# Patient Record
Sex: FEMALE | Race: WHITE | Employment: PART TIME | URBAN - METROPOLITAN AREA
[De-identification: names, ages, dates, MRNs, and addresses within clinical notes are randomized per-mention and may not be internally consistent; named-entity substitution may affect disease eponyms.]

---

## 2017-06-08 ENCOUNTER — OFFICE VISIT (OUTPATIENT)
Dept: OBGYN CLINIC | Age: 27
End: 2017-06-08

## 2017-06-08 DIAGNOSIS — N64.4 BREAST PAIN, LEFT: Primary | ICD-10-CM

## 2017-06-08 NOTE — MR AVS SNAPSHOT
Visit Information Date & Time Provider Department Dept. Phone Encounter #  
 6/8/2017  8:20 AM Huseyin Wahl MD [facility] [phone] [id number] Upcoming Health Maintenance Date Due INFLUENZA AGE 9 TO ADULT 8/1/2017 PAP AKA CERVICAL CYTOLOGY 4/22/2018 Allergies as of 6/8/2017  Review Complete On: 6/8/2017 By: Freddy Huizar Severity Noted Reaction Type Reactions Ceftin [Cefuroxime Axetil]  01/21/2013    Rash Current Immunizations  Never Reviewed No immunizations on file. Not reviewed this visit Vitals OB Status Smoking Status Having regular periods Former Smoker Preferred Pharmacy Pharmacy Name Phone [id number] [place], [address] AT [facility] [phone] Your Updated Medication List  
  
   
This list is accurate as of: 6/8/17  8:51 AM.  Always use your most recent med list.  
  
  
  
  
 biotin 2,500 mcg Tab Take  by mouth. ibuprofen 200 mg Cap Take  by mouth.  
  
 loratadine 10 mg tablet Commonly known as:  Lendon Neto Take 1 Tab by mouth daily for 360 days. norgestimate-ethinyl estradiol 0.25-35 mg-mcg Tab Commonly known as:  Blanca Florinda Take 1 Tab by mouth daily. pseudoephedrine 30 mg tablet Commonly known as:  SUDAFED Take 2 Tabs by mouth every six (6) hours as needed for Congestion. WOMEN'S DAILY MULTIVITAMIN PO Take  by mouth. Introducing hospitals & HEALTH SERVICES! Dear Cas Jeffers: Thank you for requesting a "[employer] account. Our records indicate that you already have an active "[employer] account. You can access your account anytime at [email or web address] Did you know that you can access your hospital and ER discharge instructions at any time in "[employer]? You can also review all of your test results from your hospital stay or ER visit. Additional Information If you have questions, please visit the Frequently Asked Questions section of the Done In :[employer] website at [email or web address] Remember, "[employer]" is NOT to be used for urgent needs. For medical emergencies, dial 911. Now available from your iPhone and Android! Please provide this summary of care documentation to your next provider. Your primary care clinician is listed as Gildardo Pacheco. If you have any questions after today's visit, please call [phone].

## 2017-06-08 NOTE — PROGRESS NOTES
Breast Pain Evaluation    Linda Luu is a No obstetric history on file. ,  31 y.o. female [facility] No LMP recorded. .    She presents with breast pain located in the left breast at 2 o'clock. She noticed it 2 weeks ago. The pain has changed in intensity as it has improved but is still present. The patient has not noticed changes in the area of the pain: No dimpling, nipple retraction or discharge. No masses or nodes. .    The patient notes the following associated symptoms: none    She notes that the following factors improve her symptoms: none    She notes that the following factors aggravate her symptoms:none    She has had any diagnostic studies for this problem since she noticed it. With regards to breast problems her medical history is significant for the following: none      Past Medical History:   Diagnosis Date    Heavy menses 05/2012    Pap smear for cervical cancer screening 4/22/15 neg    Psychiatric disorder     depression - stopped seeing 10/01/12    Unspecified adverse effect of anesthesia     motion sickness     Past Surgical History:   Procedure Laterality Date    HX HEENT      wisdom teeth    HX WISDOM TEETH EXTRACTION  03/2012     Social History     Occupational History    Not on file. Social History Main Topics    Smoking status: Former Smoker     Packs/day: 0.50     Years: 6.00    Smokeless tobacco: Never Used    Alcohol use 2.0 oz/week     4 Glasses of wine per week      Comment: weekly    Drug use: No    Sexual activity: Yes     Partners: Male     Birth control/ protection: Pill     Family History   Problem Relation Age of Onset    Hypertension Mother     Elevated Lipids Mother     Hypertension Father     Elevated Lipids Father     Cancer Maternal Grandfather      lung to brain       Allergies   Allergen Reactions    Ceftin [Cefuroxime Axetil] Rash     Prior to Admission medications    Medication Sig Start Date End Date Taking?  Authorizing Provider MULTIVIT WITH CALCIUM,IRON,MIN (WOMEN'S DAILY MULTIVITAMIN PO) Take  by mouth. Yes Historical Provider   norgestimate-ethinyl estradiol (ORTHO-CYCLEN) 0.25-35 mg-mcg tab Take 1 Tab by mouth daily. 6/22/16  Yes ePg Sullivan MD   biotin 2,500 mcg tab Take  by mouth. Historical Provider   loratadine (CLARITIN) 10 mg tablet Take 1 Tab by mouth daily for 360 days. 8/12/16 8/7/17  Xiomara Mays MD   pseudoephedrine (SUDAFED) 30 mg tablet Take 2 Tabs by mouth every six (6) hours as needed for Congestion. 8/12/16   Xiomara Mays MD   ibuprofen 200 mg Cap Take  by mouth. Historical Provider        Review of Systems: History obtained from the patient  Constitutional: negative for weight loss, fever, night sweats  HEENT: negative for hearing loss, earache, congestion, snoring, sorethroat  CV: negative for chest pain, palpitations, edema  Resp: negative for cough, shortness of breath, wheezing  Breast: see above  GI: negative for change in bowel habits, abdominal pain, black or bloody stools  : negative for frequency, dysuria, hematuria, vaginal discharge  MSK: negative for back pain, joint pain, muscle pain  Skin: negative for itching, rash, hives  Neuro: negative for dizziness, headache, confusion, weakness  Psych: negative for anxiety, depression, change in mood  Heme/lymph: negative for bleeding, bruising, pallor    Objective: There were no vitals taken for this visit.   Exam:  Constitutional  · Appearance: well-nourished, well developed, alert, in no acute distress    HENT  · Head and Face: appears normal    Neck  · Inspection/Palpation: normal appearance, no masses or tenderness  · Lymph Nodes: no lymphadenopathy present  · Thyroid: gland size normal, nontender, no nodules or masses present on palpation    Breasts  · Inspection of Breasts: breasts symmetrical, no skin changes, no discharge present, nipple appearance normal, no skin retraction present  · Palpation of Breasts and Axillae: no masses present on palpation, + left breast tenderness  · Axillary Lymph Nodes: no lymphadenopathy present    Assessment/Plan:  Left breast tenderness, will schedule ultrasound and refer to breast surgeon.   Likely musculoskeletal.

## 2017-06-12 ENCOUNTER — HOSPITAL ENCOUNTER (OUTPATIENT)
Dept: MAMMOGRAPHY | Age: 27
Discharge: HOME | End: 2017-06-12
Attending: OBSTETRICS & GYNECOLOGY
Payer: COMMERCIAL

## 2017-06-12 DIAGNOSIS — N63.20 BREAST MASS, LEFT: ICD-10-CM

## 2017-06-12 PROCEDURE — 76642 ULTRASOUND BREAST LIMITED: CPT

## 2017-07-24 ENCOUNTER — OFFICE VISIT (OUTPATIENT)
Dept: OBGYN CLINIC | Age: 27
End: 2017-07-24

## 2017-07-24 VITALS
WEIGHT: 132 LBS | BODY MASS INDEX: 21.21 KG/M2 | HEIGHT: 66 IN | DIASTOLIC BLOOD PRESSURE: 64 MMHG | SYSTOLIC BLOOD PRESSURE: 102 MMHG

## 2017-07-24 DIAGNOSIS — Z20.2 POSSIBLE EXPOSURE TO STD: ICD-10-CM

## 2017-07-24 DIAGNOSIS — Z01.419 WELL FEMALE EXAM WITH ROUTINE GYNECOLOGICAL EXAM: Primary | ICD-10-CM

## 2017-07-24 NOTE — MR AVS SNAPSHOT
Visit Information Date & Time Provider Department Dept. Phone Encounter #  
 7/24/2017  2:20 PM Alexis Martinez MD [facility] [phone] [id number] Upcoming Health Maintenance Date Due INFLUENZA AGE 9 TO ADULT 8/1/2017 PAP AKA CERVICAL CYTOLOGY 4/22/2018 Allergies as of 7/24/2017  Review Complete On: 7/24/2017 By: April Gay Severity Noted Reaction Type Reactions Ceftin [Cefuroxime Axetil]  01/21/2013    Rash Current Immunizations  Never Reviewed No immunizations on file. Not reviewed this visit You Were Diagnosed With   
  
 Codes Comments Well female exam with routine gynecological exam    -  Primary ICD-10-CM: O78.690 ICD-9-CM: V72.31 Possible exposure to STD     ICD-10-CM: Z20.2 ICD-9-CM: V01.6 Vitals BP Height(growth percentile) Weight(growth percentile) BMI OB Status Smoking Status 102/64 5' 6\" (1.676 m) 132 lb (59.9 kg) 21.31 kg/m2 Having regular periods Former Smoker BMI and BSA Data Body Mass Index Body Surface Area  
 21.31 kg/m 2 1.67 m 2 Preferred Pharmacy Pharmacy Name Phone [id number] [address] [facility] [phone] Your Updated Medication List  
  
   
This list is accurate as of: 7/24/17  2:41 PM.  Always use your most recent med list.  
  
  
  
  
 biotin 2,500 mcg Tab Take  by mouth. ibuprofen 200 mg Cap Take  by mouth.  
  
 loratadine 10 mg tablet Commonly known as:  Susa Fuchs Take 1 Tab by mouth daily for 360 days. norgestimate-ethinyl estradiol 0.25-35 mg-mcg Tab Commonly known as:  Carleene Sever Take 1 Tab by mouth daily. pseudoephedrine 30 mg tablet Commonly known as:  SUDAFED Take 2 Tabs by mouth every six (6) hours as needed for Congestion. WOMEN'S DAILY MULTIVITAMIN PO Take  by mouth. We Performed the Following HEP B SURFACE AG [id number] CPT(R)] HEPATITIS C AB [34775 CPT(R)] HIV 1/2 AG/AB, 4TH GENERATION,W RFLX CONFIRM [[id number] Custom] PAP IG, CT-NG TV [address] HPV [id number], [id number]) Q7810379 CPT(R)] RPR [92798 CPT(R)] Introducing [facility] & HEALTH SERVICES! Dear Estevan Villar: Thank you for requesting a [employer] account. Our records indicate that you already have an active [employer] account. You can access your account anytime at [email or web address] Did you know that you can access your hospital and ER discharge instructions at any time in [employer]? You can also review all of your test results from your hospital stay or ER visit. Additional Information If you have questions, please visit the Frequently Asked Questions section of the [employer] website at [email or web address] Remember, [employer] is NOT to be used for urgent needs. For medical emergencies, dial 911. Now available from your iPhone and Android! Please provide this summary of care documentation to your next provider. Your primary care clinician is listed as NONE. If you have any questions after today's visit, please call [phone].

## 2017-07-24 NOTE — PROGRESS NOTES
Annual exam ages 21-44    Lady Maravilla is a No obstetric history on file. ,  31 y.o. female [facility] No LMP recorded. .    She presents for her annual checkup. She is having no significant problems. She wants std testing today. With regard to the Gardasil vaccine, she is older than the FDA approved age to receive it. Menstrual status:    Her periods are moderate in flow. She is using three to five pads or tampons per day, usually regular and last 26-30 days. She denies dysmenorrhea. She reports no premenstrual symptoms. Contraception:    The current method of family planning is OCP (estrogen/progesterone). Sexual history:     She  reports that she currently engages in sexual activity and has had male partners. She reports using the following method of birth control/protection: Pill. .    Medical conditions:    Since her last annual GYN exam about one year ago, she has not the following changes in her health history: none. Pap and Mammogram History:    Her most recent Pap smear was normal, obtained 3 year(s) ago. The patient has never had a mammogram.    Breast Cancer History/Substance Abuse: negative    Past Medical History:   Diagnosis Date    Heavy menses 05/2012    Pap smear for cervical cancer screening 4/22/15 neg    Psychiatric disorder     depression - stopped seeing 10/01/12    Unspecified adverse effect of anesthesia     motion sickness     Past Surgical History:   Procedure Laterality Date    HX HEENT      wisdom teeth    HX WISDOM TEETH EXTRACTION  03/2012       Current Outpatient Prescriptions   Medication Sig Dispense Refill    norgestimate-ethinyl estradiol (ORTHO-CYCLEN) 0.25-35 mg-mcg tab Take 1 Tab by mouth daily. 90 Tab 4    MULTIVIT WITH CALCIUM,IRON,MIN (WOMEN'S DAILY MULTIVITAMIN PO) Take  by mouth.  biotin 2,500 mcg tab Take  by mouth.  loratadine (CLARITIN) 10 mg tablet Take 1 Tab by mouth daily for 360 days.  30 Tab 11    pseudoephedrine (SUDAFED) 30 mg tablet Take 2 Tabs by mouth every six (6) hours as needed for Congestion. 120 Tab 1    ibuprofen 200 mg Cap Take  by mouth. Allergies: Ceftin [cefuroxime axetil]     Tobacco History:  reports that she has quit smoking. She has a 3.00 pack-year smoking history. She has never used smokeless tobacco.  Alcohol Abuse:  reports that she drinks about 2.0 oz of alcohol per week   Drug Abuse:  reports that she does not use illicit drugs.     Family Medical/Cancer History:   Family History   Problem Relation Age of Onset    Hypertension Mother     Elevated Lipids Mother     Hypertension Father     Elevated Lipids Father     Cancer Maternal Grandfather      lung to brain        Review of Systems - History obtained from the patient  Constitutional: negative for weight loss, fever, night sweats  HEENT: negative for hearing loss, earache, congestion, snoring, sorethroat  CV: negative for chest pain, palpitations, edema  Resp: negative for cough, shortness of breath, wheezing  GI: negative for change in bowel habits, abdominal pain, black or bloody stools  : negative for frequency, dysuria, hematuria, vaginal discharge  MSK: negative for back pain, joint pain, muscle pain  Breast: negative for breast lumps, nipple discharge, galactorrhea  Skin :negative for itching, rash, hives  Neuro: negative for dizziness, headache, confusion, weakness  Psych: negative for anxiety, depression, change in mood  Heme/lymph: negative for bleeding, bruising, pallor    Physical Exam    Visit Vitals    /64    Ht 5' 6\" (1.676 m)    Wt 132 lb (59.9 kg)    BMI 21.31 kg/m2       Constitutional  · Appearance: well-nourished, well developed, alert, in no acute distress    HENT  · Head and Face: appears normal    Neck  · Inspection/Palpation: normal appearance, no masses or tenderness  · Lymph Nodes: no lymphadenopathy present  · Thyroid: gland size normal, nontender, no nodules or masses present on palpation    Chest  · Respiratory Effort: breathing unlabored    Breasts  · Inspection of Breasts: breasts symmetrical, no skin changes, no discharge present, nipple appearance normal, no skin retraction present  · Palpation of Breasts and Axillae: no masses present on palpation, no breast tenderness  · Axillary Lymph Nodes: no lymphadenopathy present    Gastrointestinal  · Abdominal Examination: abdomen non-tender to palpation, normal bowel sounds, no masses present  · Liver and spleen: no hepatomegaly present, spleen not palpable  · Hernias: no hernias identified    Genitourinary  · External Genitalia: normal appearance for age, no discharge present, no tenderness present, no inflammatory lesions present, no masses present, no atrophy present  · Vagina: normal vaginal vault without central or paravaginal defects, no discharge present, no inflammatory lesions present, no masses present  · Bladder: non-tender to palpation  · Urethra: appears normal  · Cervix: normal   · Uterus: normal size, shape and consistency  · Adnexa: no adnexal tenderness present, no adnexal masses present  · Perineum: perineum within normal limits, no evidence of trauma, no rashes or skin lesions present  · Anus: anus within normal limits, no hemorrhoids present  · Inguinal Lymph Nodes: no lymphadenopathy present    Skin  · General Inspection: no rash, no lesions identified    Neurologic/Psychiatric  · Mental Status:  · Orientation: grossly oriented to person, place and time  · Mood and Affect: mood normal, affect appropriate    . Assessment:  Routine gynecologic examination  Her current medical status is satisfactory with no evidence of significant gynecologic issues.     Plan:  Counseled re: diet, exercise, healthy lifestyle  Return for yearly wellness visits

## 2017-07-25 LAB
HBV SURFACE AG SERPL QL IA: NEGATIVE
HCV AB S/CO SERPL IA: <0.1 S/CO RATIO (ref 0–0.9)
HIV 1+2 AB+HIV1 P24 AG SERPL QL IA: NON REACTIVE
RPR SER QL: NON REACTIVE

## 2017-08-03 LAB
C TRACH RRNA CVX QL NAA+PROBE: NEGATIVE
CYTOLOGIST CVX/VAG CYTO: NORMAL
CYTOLOGY CVX/VAG DOC THIN PREP: NORMAL
CYTOLOGY HISTORY:: NORMAL
DX ICD CODE: NORMAL
LABCORP, 190119: NORMAL
Lab: NORMAL
N GONORRHOEA RRNA CVX QL NAA+PROBE: NEGATIVE
OTHER STN SPEC: NORMAL
PATH REPORT.FINAL DX SPEC: NORMAL
STAT OF ADQ CVX/VAG CYTO-IMP: NORMAL
T VAGINALIS RRNA SPEC QL NAA+PROBE: NEGATIVE

## 2017-10-06 RX ORDER — NORGESTIMATE AND ETHINYL ESTRADIOL 0.25-0.035
1 KIT ORAL DAILY
Qty: 3 PACKAGE | Refills: 4 | Status: SHIPPED | OUTPATIENT
Start: 2017-10-06 | End: 2018-11-30 | Stop reason: SDUPTHER

## 2017-10-06 NOTE — TELEPHONE ENCOUNTER
From: Sil Toth  To: Alfredo Lowe MD  Sent: 10/5/2017 5:29 PM EDT  Subject: Medication Renewal Request    Original authorizing provider: MD Hailee Card [facility] would like a refill of the following medications:  norgestimate-ethinyl estradiol (ORTHO-CYCLEN) 0.25-35 mg-mcg tab Alfredo Lowe MD]    Preferred pharmacy: [address] [place] [address] 83:  I thought I got this re-filled at my annual in July, but [facility] [address] [place] [address] Rd. in [facility] said they did not receive a new one. Patient had AE on 7/24/17.    ?ok to refill   Please advise

## 2018-03-01 ENCOUNTER — OFFICE VISIT (OUTPATIENT)
Dept: OBGYN CLINIC | Age: 28
End: 2018-03-01

## 2018-03-01 VITALS
BODY MASS INDEX: 21.21 KG/M2 | HEIGHT: 66 IN | WEIGHT: 132 LBS | DIASTOLIC BLOOD PRESSURE: 64 MMHG | SYSTOLIC BLOOD PRESSURE: 102 MMHG

## 2018-03-01 DIAGNOSIS — Z20.2 POSSIBLE EXPOSURE TO STD: ICD-10-CM

## 2018-03-01 DIAGNOSIS — N76.0 VAGINITIS AND VULVOVAGINITIS: Primary | ICD-10-CM

## 2018-03-01 RX ORDER — FLUCONAZOLE 150 MG/1
150 TABLET ORAL DAILY
Qty: 1 TAB | Refills: 0 | Status: SHIPPED | OUTPATIENT
Start: 2018-03-01 | End: 2018-03-02

## 2018-03-01 RX ORDER — NYSTATIN AND TRIAMCINOLONE ACETONIDE 100000; 1 [USP'U]/G; MG/G
OINTMENT TOPICAL 2 TIMES DAILY
Qty: 1 TUBE | Refills: 3 | Status: SHIPPED | OUTPATIENT
Start: 2018-03-01 | End: 2018-03-08

## 2018-03-01 NOTE — PATIENT INSTRUCTIONS

## 2018-03-01 NOTE — PROGRESS NOTES
Vaginitis evaluation    Chief Complaint   Vaginitis      HPI  28 y.o. female complains of white and creamy vaginal discharge with irritation for 4 days. No LMP recorded. She denies additional symptoms at this time. The patient denies aggravating factors. She is  concerned about possible STI exposure at this time and would like to get a STD panel. She denies exposure to new chemicals or hygenic agents  Previous treatment included: none    Past Medical History:   Diagnosis Date    Heavy menses 05/2012    Pap smear for cervical cancer screening 4/22/15 neg    Psychiatric disorder     depression - stopped seeing 10/01/12    Unspecified adverse effect of anesthesia     motion sickness     Past Surgical History:   Procedure Laterality Date    HX HEENT      wisdom teeth    HX WISDOM TEETH EXTRACTION  03/2012     Social History     Occupational History    Not on file. Social History Main Topics    Smoking status: Former Smoker     Packs/day: 0.50     Years: 6.00    Smokeless tobacco: Never Used    Alcohol use 2.0 oz/week     4 Glasses of wine per week      Comment: weekly    Drug use: No    Sexual activity: Yes     Partners: Male     Birth control/ protection: Pill     Family History   Problem Relation Age of Onset    Hypertension Mother     Elevated Lipids Mother     Hypertension Father     Elevated Lipids Father     Cancer Maternal Grandfather      lung to brain        Allergies   Allergen Reactions    Ceftin [Cefuroxime Axetil] Rash     Prior to Admission medications    Medication Sig Start Date End Date Taking? Authorizing Provider   norgestimate-ethinyl estradiol ([place]) 0.25-35 mg-mcg tab Take 1 Tab by mouth daily. 10/6/17   Erika Sanchez MD   MULTIVIT WITH CALCIUM,IRON,MIN [facility] DAILY MULTIVITAMIN PO) Take  by mouth. Historical Provider   biotin 2,500 mcg tab Take  by mouth.     Historical Provider   pseudoephedrine (SUDAFED) 30 mg tablet Take 2 Tabs by mouth every six (6) hours as needed for Congestion. 8/12/16   Hannah Beard MD   ibuprofen 200 mg Cap Take  by mouth.       Historical Provider                      Review of Systems - History obtained from the patient  Constitutional: negative for weight loss, fever, night sweats  Breast: negative for breast lumps, nipple discharge, galactorrhea  GI: negative for change in bowel habits, abdominal pain, black or bloody stools  : negative for frequency, dysuria, hematuria  MSK: negative for back pain, joint pain, muscle pain  Skin: negative for itching, rash, hives  Neuro: negative for dizziness, headache, confusion, weakness  Psych: negative for anxiety, depression, change in mood  Heme/lymph: negative for bleeding, bruising, pallor       Objective:    Visit Vitals    /64    Ht 5' 6\" (1.676 m)    Wt 132 lb (59.9 kg)    BMI 21.31 kg/m2       Physical Exam:   PHYSICAL EXAMINATION    Constitutional  · Appearance: well-nourished, well developed, alert, in no acute distress    HENT  · Head and Face: appears normal    Genitourinary  · External Genitalia: normal appearance for age, + discharge present, no tenderness present, no inflammatory lesions present, no masses present, no atrophy present  · Vagina:  + discharge present, otherwise normal vaginal vault without central or paravaginal defects, no inflammatory lesions present, no masses present  · Bladder: non-tender to palpation  · Urethra: appears normal  · Cervix: normal   · Uterus: normal size, shape and consistency  · Adnexa: no adnexal tenderness present, no adnexal masses present  · Perineum: perineum within normal limits, no evidence of trauma, no rashes or skin lesions present  · Anus: anus within normal limits, no hemorrhoids present  · Inguinal Lymph Nodes: no lymphadenopathy present    Skin  · General Inspection: no rash, no lesions identified    Neurologic/Psychiatric  · Mental Status:  · Orientation: grossly oriented to person, place and time  · Mood and Affect: mood normal, affect appropriate    Assessment:   Vaginitis, likely yeast, will treat with diflucan and mycolog and f/u with nuswab    Plan:   ROV prn if symptoms persist or worsen.

## 2018-03-05 LAB
A VAGINAE DNA VAG QL NAA+PROBE: ABNORMAL SCORE
BVAB2 DNA VAG QL NAA+PROBE: ABNORMAL SCORE
C ALBICANS DNA VAG QL NAA+PROBE: POSITIVE
C GLABRATA DNA VAG QL NAA+PROBE: NEGATIVE
C TRACH RRNA SPEC QL NAA+PROBE: NEGATIVE
MEGA1 DNA VAG QL NAA+PROBE: ABNORMAL SCORE
N GONORRHOEA RRNA SPEC QL NAA+PROBE: NEGATIVE
T VAGINALIS RRNA SPEC QL NAA+PROBE: NEGATIVE

## 2018-04-18 ENCOUNTER — OFFICE VISIT (OUTPATIENT)
Dept: OBGYN CLINIC | Age: 28
End: 2018-04-18

## 2018-04-18 DIAGNOSIS — N76.0 ACUTE VAGINITIS: Primary | ICD-10-CM

## 2018-04-18 RX ORDER — METRONIDAZOLE 500 MG/1
500 TABLET ORAL 2 TIMES DAILY
Qty: 14 TAB | Refills: 0 | Status: SHIPPED | OUTPATIENT
Start: 2018-04-18 | End: 2018-04-25

## 2018-04-18 NOTE — PROGRESS NOTES
Chief Complaint   Vaginitis      HPI  28 y.o. female complains of clear vaginal discharge since yesterday. .No LMP recorded. She admits to additional symptoms at this time. Vaginal odor  The patient  admits to aggravating factors- finished antibiotics yesterday for UTI  She denies exposure to new chemicals ot hygenic agents  Previous treatment included:  3/1/18 yeast infection treated with diflucan and mycolog    Past Medical History:   Diagnosis Date    Heavy menses 05/2012    Pap smear for cervical cancer screening 4/22/15 neg    Psychiatric disorder     depression - stopped seeing 10/01/12    Unspecified adverse effect of anesthesia     motion sickness     Past Surgical History:   Procedure Laterality Date    HX HEENT      wisdom teeth    HX WISDOM TEETH EXTRACTION  03/2012     Social History     Occupational History    Not on file. Social History Main Topics    Smoking status: Former Smoker     Packs/day: 0.50     Years: 6.00    Smokeless tobacco: Never Used    Alcohol use 2.0 oz/week     4 Glasses of wine per week      Comment: weekly    Drug use: No    Sexual activity: Yes     Partners: Male     Birth control/ protection: Pill     Family History   Problem Relation Age of Onset    Hypertension Mother     Elevated Lipids Mother     Hypertension Father     Elevated Lipids Father     Cancer Maternal Grandfather      lung to brain        Allergies   Allergen Reactions    Ceftin [Cefuroxime Axetil] Rash     Prior to Admission medications    Medication Sig Start Date End Date Taking? Authorizing Provider   norgestimate-ethinyl estradiol (Elizabeth King) 0.25-35 mg-mcg tab Take 1 Tab by mouth daily. 10/6/17   Sergio Vieyra MD   MULTIVIT WITH CALCIUM,IRON,MIN [facility] DAILY MULTIVITAMIN PO) Take  by mouth. Historical Provider   biotin 2,500 mcg tab Take  by mouth.     Historical Provider   pseudoephedrine (SUDAFED) 30 mg tablet Take 2 Tabs by mouth every six (6) hours as needed for Congestion. 8/12/16   Skyler Echavarria MD   ibuprofen 200 mg Cap Take  by mouth. Historical Provider                      Review of Systems - History obtained from the patient  Constitutional: negative for weight loss, fever, night sweats  Breast: negative for breast lumps, nipple discharge, galactorrhea  GI: negative for change in bowel habits, abdominal pain, black or bloody stools  : negative for frequency, dysuria, hematuria  MSK: negative for back pain, joint pain, muscle pain  Skin: negative for itching, rash, hives  Neuro: negative for dizziness, headache, confusion, weakness  Psych: negative for anxiety, depression, change in mood  Heme/lymph: negative for bleeding, bruising, pallor       Objective: There were no vitals taken for this visit.     Physical Exam:   PHYSICAL EXAMINATION    Constitutional  · Appearance: well-nourished, well developed, alert, in no acute distress    HENT  · Head and Face: appears normal    Genitourinary  · External Genitalia: normal appearance for age, + discharge present, no tenderness present, no inflammatory lesions present, no masses present, no atrophy present  · Vagina:  + discharge present, otherwise normal vaginal vault without central or paravaginal defects, no inflammatory lesions present, no masses present  · Bladder: non-tender to palpation  · Urethra: appears normal  · Cervix: normal   · Uterus: normal size, shape and consistency  · Adnexa: no adnexal tenderness present, no adnexal masses present  · Perineum: perineum within normal limits, no evidence of trauma, no rashes or skin lesions present  · Anus: anus within normal limits, no hemorrhoids present  · Inguinal Lymph Nodes: no lymphadenopathy present    Skin  · General Inspection: no rash, no lesions identified    Neurologic/Psychiatric  · Mental Status:  · Orientation: grossly oriented to person, place and time  · Mood and Affect: mood normal, affect appropriate    Assessment:   Vaginitis- likely bv, will treat with flagyl and f/u with nuswab    Plan:     ROV prn if symptoms persist or worsen.

## 2018-04-21 LAB
A VAGINAE DNA VAG QL NAA+PROBE: NORMAL SCORE
BVAB2 DNA VAG QL NAA+PROBE: NORMAL SCORE
C ALBICANS DNA VAG QL NAA+PROBE: NEGATIVE
C GLABRATA DNA VAG QL NAA+PROBE: NEGATIVE
C TRACH RRNA SPEC QL NAA+PROBE: NEGATIVE
MEGA1 DNA VAG QL NAA+PROBE: NORMAL SCORE
N GONORRHOEA RRNA SPEC QL NAA+PROBE: NEGATIVE
T VAGINALIS RRNA SPEC QL NAA+PROBE: NEGATIVE

## 2018-11-19 ENCOUNTER — TELEPHONE (OUTPATIENT)
Dept: OBGYN CLINIC | Age: 28
End: 2018-11-19

## 2018-11-19 NOTE — TELEPHONE ENCOUNTER
Message left at 330PM    33year old patient last seen in the office on 7/24/17. Patient calling about a refill on her birth control. This nurse called the patient back and advised of need for AE needed and to schedule and ask to speak to nurse to get refill on OCP till she is seen.

## 2018-11-30 ENCOUNTER — OFFICE VISIT (OUTPATIENT)
Dept: OBGYN CLINIC | Age: 28
End: 2018-11-30

## 2018-11-30 VITALS
DIASTOLIC BLOOD PRESSURE: 70 MMHG | SYSTOLIC BLOOD PRESSURE: 104 MMHG | WEIGHT: 129 LBS | HEIGHT: 66 IN | BODY MASS INDEX: 20.73 KG/M2

## 2018-11-30 DIAGNOSIS — Z01.419 ENCOUNTER FOR GYNECOLOGICAL EXAMINATION WITHOUT ABNORMAL FINDING: Primary | ICD-10-CM

## 2018-11-30 RX ORDER — SERTRALINE HYDROCHLORIDE 25 MG/1
TABLET, FILM COATED ORAL DAILY
COMMUNITY

## 2018-11-30 RX ORDER — BUPROPION HYDROCHLORIDE 75 MG/1
TABLET ORAL 2 TIMES DAILY
COMMUNITY

## 2018-11-30 RX ORDER — NORGESTIMATE AND ETHINYL ESTRADIOL 0.25-0.035
1 KIT ORAL DAILY
Qty: 3 PACKAGE | Refills: 4 | Status: SHIPPED | OUTPATIENT
Start: 2018-11-30

## 2018-11-30 NOTE — PROGRESS NOTES
Annual exam ages 21-44    Dillan Colmenares is a No obstetric history on file. ,  33 y.o. female [facility] [place] No LMP recorded. .    She presents for her annual checkup. She is having no significant problems. With regard to the Gardasil vaccine, she is older than the FDA approved age to receive it. Menstrual status:    Her periods are moderate in flow. She is using three to five pads or tampons per day, usually regular and last 26-30 days. She denies dysmenorrhea. She reports no premenstrual symptoms. Contraception:    The current method of family planning is condoms as she has no refills on her OCP's. Sexual history:     She  reports that she currently engages in sexual activity and has had partners who are Male. She reports using the following method of birth control/protection: Condom. .    Medical conditions:    Since her last annual GYN exam about one year ago, she has not the following changes in her health history: none. Pap and Mammogram History:    Her most recent Pap smear was normal, obtained 1 year(s) ago. The patient has never had a mammogram.    Breast Cancer History/Substance Abuse: negative    Past Medical History:   Diagnosis Date    Heavy menses 05/2012    Pap smear for cervical cancer screening 4/22/15 neg 7/24/17 neg    Psychiatric disorder     depression - stopped seeing 10/01/12    Unspecified adverse effect of anesthesia     motion sickness     Past Surgical History:   Procedure Laterality Date    HX HEENT      wisdom teeth    HX WISDOM TEETH EXTRACTION  03/2012       Current Outpatient Medications   Medication Sig Dispense Refill    buPROPion (WELLBUTRIN) 75 mg tablet Take  by mouth two (2) times a day.  sertraline (ZOLOFT) 25 mg tablet Take  by mouth daily.  norgestimate-ethinyl estradiol (ORTHO-CYCLEN, SPRINTEC) 0.25-35 mg-mcg tab Take 1 Tab by mouth daily.  3 Package 4    MULTIVIT WITH CALCIUM,IRON,MIN [facility] DAILY MULTIVITAMIN PO) Take by mouth.  biotin 2,500 mcg tab Take  by mouth.  pseudoephedrine (SUDAFED) 30 mg tablet Take 2 Tabs by mouth every six (6) hours as needed for Congestion. 120 Tab 1    ibuprofen 200 mg Cap Take  by mouth. Allergies: Ceftin [cefuroxime axetil]     Tobacco History:  reports that she has quit smoking. She has a 3.00 pack-year smoking history. she has never used smokeless tobacco.  Alcohol Abuse:  reports that she drinks about 2.0 oz of alcohol per week. Drug Abuse:  reports that she does not use drugs.     Family Medical/Cancer History:   Family History   Problem Relation Age of Onset    Hypertension Mother     Elevated Lipids Mother     Hypertension Father     Elevated Lipids Father     Cancer Maternal Grandfather         lung to brain        Review of Systems - History obtained from the patient  Constitutional: negative for weight loss, fever, night sweats  HEENT: negative for hearing loss, earache, congestion, snoring, sorethroat  CV: negative for chest pain, palpitations, edema  Resp: negative for cough, shortness of breath, wheezing  GI: negative for change in bowel habits, abdominal pain, black or bloody stools  : negative for frequency, dysuria, hematuria, vaginal discharge  MSK: negative for back pain, joint pain, muscle pain  Breast: negative for breast lumps, nipple discharge, galactorrhea  Skin :negative for itching, rash, hives  Neuro: negative for dizziness, headache, confusion, weakness  Psych: negative for anxiety, depression, change in mood  Heme/lymph: negative for bleeding, bruising, pallor    Physical Exam    Visit Vitals  /70   Ht 5' 6\" (1.676 m)   Wt 129 lb (58.5 kg)   BMI 20.82 kg/m²       Constitutional  · Appearance: well-nourished, well developed, alert, in no acute distress    HENT  · Head and Face: appears normal    Neck  · Inspection/Palpation: normal appearance, no masses or tenderness  · Lymph Nodes: no lymphadenopathy present  · Thyroid: gland size normal, nontender, no nodules or masses present on palpation    Chest  · Respiratory Effort: breathing unlabored    Breasts  · Inspection of Breasts: breasts symmetrical, no skin changes, no discharge present, nipple appearance normal, no skin retraction present  · Palpation of Breasts and Axillae: no masses present on palpation, no breast tenderness  · Axillary Lymph Nodes: no lymphadenopathy present    Gastrointestinal  · Abdominal Examination: abdomen non-tender to palpation, normal bowel sounds, no masses present  · Liver and spleen: no hepatomegaly present, spleen not palpable  · Hernias: no hernias identified    Genitourinary  · External Genitalia: normal appearance for age, no discharge present, no tenderness present, no inflammatory lesions present, no masses present, no atrophy present  · Vagina: normal vaginal vault without central or paravaginal defects, no discharge present, no inflammatory lesions present, no masses present  · Bladder: non-tender to palpation  · Urethra: appears normal  · Cervix: normal   · Uterus: normal size, shape and consistency  · Adnexa: no adnexal tenderness present, no adnexal masses present  · Perineum: perineum within normal limits, no evidence of trauma, no rashes or skin lesions present  · Anus: anus within normal limits, no hemorrhoids present  · Inguinal Lymph Nodes: no lymphadenopathy present    Skin  · General Inspection: no rash, no lesions identified    Neurologic/Psychiatric  · Mental Status:  · Orientation: grossly oriented to person, place and time  · Mood and Affect: mood normal, affect appropriate    . Assessment:  Routine gynecologic examination  Her current medical status is satisfactory with no evidence of significant gynecologic issues.     Plan:  Counseled re: diet, exercise, healthy lifestyle  Return for yearly wellness visits  Gardisil counseling provided  Pt counseled regarding co-testing for high risk HPV with pap  Rec screening mammo at either 34 or 39

## 2020-06-11 ENCOUNTER — OFFICE VISIT (OUTPATIENT)
Dept: SURGERY | Age: 30
End: 2020-06-11

## 2020-06-11 VITALS
SYSTOLIC BLOOD PRESSURE: 100 MMHG | TEMPERATURE: 98.4 F | HEIGHT: 66 IN | WEIGHT: 129 LBS | DIASTOLIC BLOOD PRESSURE: 60 MMHG | BODY MASS INDEX: 20.73 KG/M2

## 2020-06-11 DIAGNOSIS — R23.4 BREAST SKIN CHANGES: Primary | ICD-10-CM

## 2020-06-11 RX ORDER — FEXOFENADINE HCL AND PSEUDOEPHEDRINE HCI 60; 120 MG/1; MG/1
1 TABLET, EXTENDED RELEASE ORAL EVERY 12 HOURS
COMMUNITY

## 2020-06-11 NOTE — PATIENT INSTRUCTIONS

## 2020-06-11 NOTE — PROGRESS NOTES
HISTORY OF PRESENT ILLNESS  Luna Gates is a 26 y.o. female. HPI NEW patient referral for consultation by [facility] for BILATERAL nipple discoloration. The patient denies any nipple discharge but she feels she \"may\" feel BILATERAL palpable lumps. Last summer the patient was tanning in a tanning bed frequently and noticed a scab on her RIGHT breast which fell off and drained and then healed. The patient states that is the area where the discoloration is on one of her nipples. Her breasts feel lumpy. Family history - mother breast cancer DCIS at age 48. lumpectomy and radiation, then contralateral disease and BILATERAL mastectomies with reconstruction. She is now 54 and doing well. Ultrasound LEFT breast 2017 for LEFT breast pain - BI RADS 1   Past Medical History:   Diagnosis Date    Heavy menses 05/2012    Pap smear for cervical cancer screening 4/22/15 neg 7/24/17 neg    Psychiatric disorder     depression - stopped seeing 10/01/12    Unspecified adverse effect of anesthesia     motion sickness     Past Surgical History:   Procedure Laterality Date    HX HEENT      wisdom teeth    HX WISDOM TEETH EXTRACTION  03/2012      OB History    No obstetric history on file. Obstetric Comments   Menarche 5, LMP 06/04/20, # of children 0, age of 1st delivery 0, Hysterectomy/oophorectomy NO/No, Breast bx NO, history of breast feeding NO, BCP previous, Hormone therapy NO           Family History   Problem Relation Age of Onset    Hypertension Mother     Elevated Lipids Mother     Hypertension Father     Elevated Lipids Father     Cancer Maternal Grandfather         lung to brain     Social History     Tobacco Use    Smoking status: Former Smoker     Packs/day: 0.50     Years: 6.00     Pack years: 3.00    Smokeless tobacco: Never Used   Substance Use Topics    Alcohol use:  Yes     Alcohol/week: 3.3 standard drinks     Types: 4 Glasses of wine per week     Comment: weekly      Prior to Admission medications    Medication Sig Start Date End Date Taking? Authorizing Provider   fexofenadine-pseudoephedrine (Allegra-D 12 Hour)  mg Tb12 Take 1 Tab by mouth every twelve (12) hours. Yes Provider, Historical   buPROPion (WELLBUTRIN) 75 mg tablet Take  by mouth two (2) times a day. Yes Provider, Historical   sertraline (ZOLOFT) 25 mg tablet Take  by mouth daily. Yes Provider, Historical   ibuprofen 200 mg Cap Take  by mouth. Yes Provider, Historical   norgestimate-ethinyl estradiol (ORTHO-CYCLEN, [facility]) 0.25-35 mg-mcg tab Take 1 Tab by mouth daily. 11/30/18   Ruby Vigil MD   MULTIVIT WITH CALCIUM,IRON,MIN [facility] DAILY MULTIVITAMIN PO) Take  by mouth. Provider, Historical   biotin 2,500 mcg tab Take  by mouth. Provider, Historical   pseudoephedrine (SUDAFED) 30 mg tablet Take 2 Tabs by mouth every six (6) hours as needed for Congestion. 8/12/16   Jana Stearns MD      Allergies   Allergen Reactions    Ceftin [Cefuroxime Axetil] Rash       Review of Systems   Constitutional: Negative. HENT: Negative. Eyes: Negative. Respiratory: Negative. Cardiovascular: Negative. Gastrointestinal: Negative. Genitourinary: Negative. Musculoskeletal: Negative. Skin: Negative. Neurological: Negative. Endo/Heme/Allergies: Negative. Psychiatric/Behavioral: Negative. Physical Exam  Chest:      Breasts: Breasts are symmetrical.         Right: Skin change (pale skin plaques on the nipples. ) present. No inverted nipple, mass, nipple discharge or tenderness. Left: Skin change present. No inverted nipple, mass, nipple discharge or tenderness. Lymphadenopathy:      Upper Body:      Right upper body: No supraclavicular or axillary adenopathy. Left upper body: No supraclavicular or axillary adenopathy. BREAST ULTRASOUND  Indication: Bilateral  Breast, nodular breast tissue  Technique:   The area was scanned using a high-frequency linear-array near-field transducer  Findings: No abnormal mass, lesion, or shadowing noted. No cysts. No axillary lymphadenopathy  Impression: Normal dense breast tissue   Disposition: No worrisome finding on ultrasound  ASSESSMENT and PLAN    ICD-10-CM ICD-9-CM    1. Breast skin changes R23.4 782.8      1. Bilateral nipple skin changes are a benign normal variant. 2. No worrisome breast mass on physical exam or ultrasound. Pt reassured. 3. Her mother probably could be BRCA tested as she had bilateral breast cancer. I explained that the risk of her mother having an abnormal gene is <5%.    PRN follow up

## 2023-05-16 RX ORDER — NORGESTIMATE AND ETHINYL ESTRADIOL 0.25-0.035
1 KIT ORAL DAILY
COMMUNITY
Start: 2018-11-30

## 2023-05-16 RX ORDER — BUPROPION HYDROCHLORIDE 75 MG/1
TABLET ORAL 2 TIMES DAILY
COMMUNITY

## 2023-05-16 RX ORDER — PSEUDOEPHEDRINE HCL 30 MG
60 TABLET ORAL EVERY 6 HOURS PRN
COMMUNITY
Start: 2016-08-12

## 2023-05-16 RX ORDER — FEXOFENADINE HCL AND PSEUDOEPHEDRINE HCI 60; 120 MG/1; MG/1
1 TABLET, EXTENDED RELEASE ORAL EVERY 12 HOURS
COMMUNITY

## 2023-05-16 RX ORDER — OMEGA-3 FATTY ACIDS/FISH OIL 300-1000MG
CAPSULE ORAL
COMMUNITY

## 2023-05-16 RX ORDER — SERTRALINE HYDROCHLORIDE 25 MG/1
TABLET, FILM COATED ORAL DAILY
COMMUNITY

## 2025-01-27 ENCOUNTER — TRANSCRIBE ORDERS (OUTPATIENT)
Facility: HOSPITAL | Age: 35
End: 2025-01-27

## 2025-01-27 DIAGNOSIS — M95.4 ACQUIRED CHEST AND RIB DEFORMITY: Primary | ICD-10-CM

## 2025-01-30 ENCOUNTER — TRANSCRIBE ORDERS (OUTPATIENT)
Facility: HOSPITAL | Age: 35
End: 2025-01-30

## 2025-01-30 DIAGNOSIS — N83.201 UNSPECIFIED OVARIAN CYST, RIGHT SIDE: Primary | ICD-10-CM

## 2025-02-10 ENCOUNTER — HOSPITAL ENCOUNTER (OUTPATIENT)
Facility: HOSPITAL | Age: 35
Discharge: HOME | End: 2025-02-13
Payer: COMMERCIAL

## 2025-02-10 ENCOUNTER — HOSPITAL ENCOUNTER (OUTPATIENT)
Facility: HOSPITAL | Age: 35
Discharge: HOME | End: 2025-02-13
Attending: OBSTETRICS & GYNECOLOGY
Payer: COMMERCIAL

## 2025-02-10 DIAGNOSIS — M95.4 ACQUIRED CHEST AND RIB DEFORMITY: ICD-10-CM

## 2025-02-10 DIAGNOSIS — N83.201 UNSPECIFIED OVARIAN CYST, RIGHT SIDE: ICD-10-CM

## 2025-02-10 PROCEDURE — A9579 GAD-BASE MR CONTRAST NOS,1ML: HCPCS | Performed by: RADIOLOGY

## 2025-02-10 PROCEDURE — 74183 MRI ABD W/O CNTR FLWD CNTR: CPT

## 2025-02-10 PROCEDURE — 6360000004 HC RX CONTRAST MEDICATION: Performed by: RADIOLOGY

## 2025-02-10 PROCEDURE — 72197 MRI PELVIS W/O & W/DYE: CPT

## 2025-02-10 RX ADMIN — GADOTERIDOL 12 ML: 279.3 INJECTION, SOLUTION INTRAVENOUS at 20:28
